# Patient Record
Sex: MALE | NOT HISPANIC OR LATINO | URBAN - METROPOLITAN AREA
[De-identification: names, ages, dates, MRNs, and addresses within clinical notes are randomized per-mention and may not be internally consistent; named-entity substitution may affect disease eponyms.]

---

## 2022-04-30 ENCOUNTER — TELEPHONE ENCOUNTER (OUTPATIENT)
Dept: URBAN - METROPOLITAN AREA CLINIC 121 | Facility: CLINIC | Age: 32
End: 2022-04-30

## 2022-04-30 RX ORDER — SUCRALFATE 1 G/10ML
1GM PO QID PRN SUSPENSION ORAL
OUTPATIENT
Start: 2014-02-21

## 2022-04-30 RX ORDER — PHENOBARBITAL, HYOSCYAMINE SULFATE, ATROPINE SULFATE, SCOPOLAMINE HYDROBROMIDE 16.2; .1037; .0194; .0065 MG/5ML; MG/5ML; MG/5ML; MG/5ML
10CC PO Q 6 HOURS PRN ELIXIR ORAL
OUTPATIENT
Start: 2015-06-16

## 2022-04-30 RX ORDER — SUCRALFATE 1 G/10ML
1GM PO QID PRN SUSPENSION ORAL
OUTPATIENT
Start: 2014-02-21 | End: 2016-05-18

## 2022-04-30 RX ORDER — OMEPRAZOLE 20 MG/1
QD CAPSULE, DELAYED RELEASE ORAL
OUTPATIENT
Start: 2014-01-24 | End: 2016-05-18

## 2022-04-30 RX ORDER — PHENOBARBITAL, HYOSCYAMINE SULFATE, ATROPINE SULFATE, SCOPOLAMINE HYDROBROMIDE 16.2; .1037; .0194; .0065 MG/5ML; MG/5ML; MG/5ML; MG/5ML
10CC PO Q 6 HOURS PRN ELIXIR ORAL
OUTPATIENT
Start: 2015-06-16 | End: 2016-05-18

## 2022-04-30 RX ORDER — OMEPRAZOLE 20 MG/1
QD CAPSULE, DELAYED RELEASE ORAL
OUTPATIENT
Start: 2014-01-24

## 2022-04-30 RX ORDER — LEVOTHYROXINE SODIUM 75 MCG
QD TABLET ORAL
OUTPATIENT
Start: 2014-01-24

## 2022-05-01 ENCOUNTER — TELEPHONE ENCOUNTER (OUTPATIENT)
Dept: URBAN - METROPOLITAN AREA CLINIC 121 | Facility: CLINIC | Age: 32
End: 2022-05-01

## 2022-05-01 RX ORDER — LEVOTHYROXINE SODIUM 75 MCG
QD TABLET ORAL
Status: ACTIVE | COMMUNITY
Start: 2014-01-24

## 2022-05-01 RX ORDER — ESOMEPRAZOLE MAGNESIUM 40 MG
1 CAPSULE PO QD CAPSULE,DELAYED RELEASE (ENTERIC COATED) ORAL
Status: ACTIVE | COMMUNITY
Start: 2016-05-18

## 2022-05-01 RX ORDER — FAMOTIDINE 10 MG
QD TABLET ORAL
Status: ACTIVE | COMMUNITY
Start: 2016-05-18

## 2022-05-10 ENCOUNTER — WORRISOME GROWTH - SEE NOTE (OUTPATIENT)
Dept: URBAN - METROPOLITAN AREA CLINIC 31 | Facility: CLINIC | Age: 32
Setting detail: DERMATOLOGY
End: 2022-05-10

## 2022-05-10 DIAGNOSIS — L57.0 ACTINIC KERATOSIS: ICD-10-CM

## 2022-05-10 PROBLEM — L608 703.8: Status: ACTIVE | Noted: 2022-05-10

## 2022-05-10 PROBLEM — L601 703.8: Status: ACTIVE | Noted: 2022-05-10

## 2022-05-10 PROBLEM — L603 703.8: Status: ACTIVE | Noted: 2022-05-10

## 2022-05-10 PROBLEM — L602 703.8: Status: ACTIVE | Noted: 2022-05-10

## 2022-05-10 PROBLEM — L604 703.8: Status: ACTIVE | Noted: 2022-05-10

## 2022-05-10 PROCEDURE — 99203 OFFICE O/P NEW LOW 30 MIN: CPT

## 2025-05-27 ENCOUNTER — OFFICE VISIT (OUTPATIENT)
Dept: URBAN - METROPOLITAN AREA CLINIC 27 | Facility: CLINIC | Age: 35
End: 2025-05-27
Payer: COMMERCIAL

## 2025-05-27 ENCOUNTER — DASHBOARD ENCOUNTERS (OUTPATIENT)
Age: 35
End: 2025-05-27

## 2025-05-27 DIAGNOSIS — K21.9 GASTRO-ESOPHAGEAL REFLUX DISEASE WITHOUT ESOPHAGITIS: ICD-10-CM

## 2025-05-27 DIAGNOSIS — K44.9 HIATAL HERNIA: ICD-10-CM

## 2025-05-27 DIAGNOSIS — Z12.11 SCREEN FOR COLON CANCER: ICD-10-CM

## 2025-05-27 PROCEDURE — 99204 OFFICE O/P NEW MOD 45 MIN: CPT | Performed by: INTERNAL MEDICINE

## 2025-05-27 RX ORDER — ESOMEPRAZOLE MAGNESIUM 40 MG
1 CAPSULE PO QD CAPSULE,DELAYED RELEASE (ENTERIC COATED) ORAL
Status: ACTIVE | COMMUNITY
Start: 2016-05-18

## 2025-05-27 RX ORDER — FAMOTIDINE 10 MG
QD TABLET ORAL
Status: ACTIVE | COMMUNITY
Start: 2016-05-18

## 2025-05-27 RX ORDER — LEVOTHYROXINE SODIUM 75 UG/1
QD TABLET ORAL
Status: ACTIVE | COMMUNITY
Start: 2014-01-24

## 2025-05-27 NOTE — HPI-HPI
Wale Damon is a 34-year-old male who reports experiencing increased belching and bloating over the past year to 18 months. He describes these symptoms as irritations rather than significant problems, and they do not affect his quality of life. He has been taking Nexium for symptom management. Wale mentions that his oncologist recommended a colonoscopy and an upper endoscopy due to his history of lymphoma and slight changes in symptoms. He has not experienced any tenderness in the abdominal area and reports that his bowel movements are fine. Wale had a routine physical earlier this year, and his blood work results were normal.

## 2025-06-11 ENCOUNTER — OFFICE VISIT (OUTPATIENT)
Dept: URBAN - METROPOLITAN AREA SURGERY CENTER 7 | Facility: SURGERY CENTER | Age: 35
End: 2025-06-11

## 2025-06-20 ENCOUNTER — TELEPHONE ENCOUNTER (OUTPATIENT)
Dept: URBAN - METROPOLITAN AREA CLINIC 27 | Facility: CLINIC | Age: 35
End: 2025-06-20

## 2025-06-25 ENCOUNTER — OFFICE VISIT (OUTPATIENT)
Dept: URBAN - METROPOLITAN AREA SURGERY CENTER 7 | Facility: SURGERY CENTER | Age: 35
End: 2025-06-25

## 2025-06-25 ENCOUNTER — CLAIMS CREATED FROM THE CLAIM WINDOW (OUTPATIENT)
Dept: URBAN - METROPOLITAN AREA CLINIC 4 | Facility: CLINIC | Age: 35
End: 2025-06-25
Payer: COMMERCIAL

## 2025-06-25 DIAGNOSIS — K21.9 GASTRO-ESOPHAGEAL REFLUX DISEASE WITHOUT ESOPHAGITIS: ICD-10-CM

## 2025-06-25 DIAGNOSIS — K31.89 OTHER DISEASES OF STOMACH AND DUODENUM: ICD-10-CM

## 2025-06-25 DIAGNOSIS — D12.3 BENIGN NEOPLASM OF TRANSVERSE COLON: ICD-10-CM

## 2025-06-25 PROCEDURE — 88312 SPECIAL STAINS GROUP 1: CPT | Performed by: PATHOLOGY

## 2025-06-25 PROCEDURE — 88305 TISSUE EXAM BY PATHOLOGIST: CPT | Performed by: PATHOLOGY

## 2025-06-25 RX ORDER — ESOMEPRAZOLE MAGNESIUM 40 MG
1 CAPSULE PO QD CAPSULE,DELAYED RELEASE (ENTERIC COATED) ORAL
Status: ACTIVE | COMMUNITY
Start: 2016-05-18

## 2025-06-25 RX ORDER — FAMOTIDINE 10 MG
QD TABLET ORAL
Status: ACTIVE | COMMUNITY
Start: 2016-05-18

## 2025-06-25 RX ORDER — LEVOTHYROXINE SODIUM 75 UG/1
QD TABLET ORAL
Status: ACTIVE | COMMUNITY
Start: 2014-01-24